# Patient Record
Sex: FEMALE | Race: WHITE | NOT HISPANIC OR LATINO | Employment: OTHER | URBAN - METROPOLITAN AREA
[De-identification: names, ages, dates, MRNs, and addresses within clinical notes are randomized per-mention and may not be internally consistent; named-entity substitution may affect disease eponyms.]

---

## 2019-02-11 ENCOUNTER — TELEPHONE (OUTPATIENT)
Dept: GASTROENTEROLOGY | Facility: CLINIC | Age: 39
End: 2019-02-11

## 2019-02-11 NOTE — TELEPHONE ENCOUNTER
Pt left [facility] stating she was in the ER on 1/27 and had an endoscopy and bx; asks for CB w/ results to [phone]

## 2021-08-03 ENCOUNTER — VBI (OUTPATIENT)
Dept: ADMINISTRATIVE | Facility: OTHER | Age: 41
End: 2021-08-03

## 2021-08-03 NOTE — TELEPHONE ENCOUNTER
Upon review of the In Basket request we were able to locate, review, and update the patient chart as requested for Immunization(s) Influenza  and Pap Smear (HPV) aka Cervical Cancer Screening  Any additional questions or concerns should be emailed to the Practice Liaisons via [email or web address] email, please do not reply via In Basket      Thank you  Shara Posada

## 2021-09-23 ENCOUNTER — ANNUAL EXAM (OUTPATIENT)
Dept: OBGYN CLINIC | Facility: CLINIC | Age: 41
End: 2021-09-23
Payer: COMMERCIAL

## 2021-09-23 VITALS
DIASTOLIC BLOOD PRESSURE: 70 MMHG | SYSTOLIC BLOOD PRESSURE: 118 MMHG | BODY MASS INDEX: 24.79 KG/M2 | HEIGHT: 65 IN | WEIGHT: 148.8 LBS

## 2021-09-23 DIAGNOSIS — Z01.419 ROUTINE GYNECOLOGICAL EXAMINATION: ICD-10-CM

## 2021-09-23 DIAGNOSIS — N90.7 INCLUSION CYST OF VULVA: ICD-10-CM

## 2021-09-23 DIAGNOSIS — Z80.3 FAMILY HISTORY OF BREAST CANCER: ICD-10-CM

## 2021-09-23 DIAGNOSIS — Z12.31 ENCOUNTER FOR SCREENING MAMMOGRAM FOR MALIGNANT NEOPLASM OF BREAST: Primary | ICD-10-CM

## 2021-09-23 PROCEDURE — 99396 PREV VISIT EST AGE 40-64: CPT | Performed by: OBSTETRICS & GYNECOLOGY

## 2021-09-23 NOTE — PROGRESS NOTES
[address] [facility]   [address], [place]    ASSESSMENT/PLAN: Jeri Cardenas is a 39 y o   who presents for annual gynecologic exam     Encounter for routine gynecologic examination  - Routine well woman exam completed today  - Cervical Cancer Screening: Current ASCCP Guidelines reviewed  Last Pap: 2020   Next Pap Due:   - COVID vaccine  JJ 3/2021  Flu shot  encourged  - STI screening offered including HIV testing: na  - Contraceptive counseling discussed  Current   Vasectomy  - Breast Cancer Screening: Last Mammogram  Summer  2021  At  [facility]   No fu needed  Hx of  Biopsy  x1  Benign  + family hx dw  Pt   Genetics      Additional problems addressed during this visit:  1  Encounter for screening mammogram for malignant neoplasm of breast  -     Mammo screening bilateral w 3d & cad; Future; Expected date: 2022    2  Family history of breast cancer  -     Mammo screening bilateral w 3d & cad; Future; Expected date: 2022        CC:  Annual Gynecologic Examination    HPI: Jeri Cardenas is a 39 y o   who presents for annual gynecologic examination  HPI    The following portions of the patient's history were reviewed and updated as appropriate: She  has a past medical history of Breast mass,  4 para 4, History of diagnostic mammography (2019), History of screening mammography (2021), Irregular menses, Left knee injury, Lump in female breast, Papanicolaou smear (09/15/2020), Postcoital bleeding, and Postpartum mastitis  She  has a past surgical history that includes Mammo (historical) (Bilateral); Breast excisional biopsy (Left, 2019); and Cyst Removal (Left, 2021)  Her family history includes Breast cancer in her maternal aunt and paternal grandmother  She  reports that she has quit smoking  Her smoking use included cigarettes  She has never used smokeless tobacco  She reports current alcohol use   She reports that she does not use drugs  No current outpatient medications on file  No current facility-administered medications for this visit  She has No Known Allergies       Review of Systems   Constitutional: Negative for chills and fever  HENT: Negative for ear pain and sore throat  Eyes: Negative for pain and visual disturbance  Respiratory: Negative for cough and shortness of breath  Cardiovascular: Negative for chest pain and palpitations  Gastrointestinal: Negative for abdominal pain and vomiting  Genitourinary: Negative for dysuria and hematuria  Musculoskeletal: Negative for arthralgias and back pain  Skin: Negative for color change and rash  Neurological: Negative for seizures and syncope  All other systems reviewed and are negative  Objective:  /70   Ht 5' 5 25" (1 657 m)   Wt 67 5 kg (148 lb 12 8 oz)   LMP 09/06/2021 (Exact Date)   Breastfeeding No   BMI 24 57 kg/m²    Physical Exam  Vitals and nursing note reviewed  Constitutional:       Appearance: Normal appearance  HENT:      Head: Normocephalic  Cardiovascular:      Rate and Rhythm: Normal rate and regular rhythm  Pulmonary:      Effort: Pulmonary effort is normal       Breath sounds: Normal breath sounds  Chest:      Chest wall: No mass, lacerations, swelling, tenderness or edema  Breasts: Bradley Score is 4  Breasts are symmetrical          Right: Normal  No swelling, bleeding, inverted nipple, mass, nipple discharge, skin change or tenderness  Left: No swelling, bleeding, inverted nipple, mass, nipple discharge, skin change or tenderness  Abdominal:      General: Abdomen is flat  Bowel sounds are normal       Palpations: Abdomen is soft  Genitourinary:     General: Normal vulva  Exam position: Lithotomy position  Pubic Area: No rash  Bradley stage (genital): 4       Labia:         Right: No rash, tenderness or lesion  Left: No rash, tenderness or lesion         Urethra: No urethral pain, urethral swelling or urethral lesion  Vagina: Normal       Cervix: No cervical motion tenderness or discharge  Uterus: Normal        Adnexa: Right adnexa normal and left adnexa normal       Rectum: Normal        Musculoskeletal:         General: Normal range of motion  Cervical back: Neck supple  Lymphadenopathy:      Upper Body:      Right upper body: No supraclavicular, axillary or pectoral adenopathy  Left upper body: No supraclavicular, axillary or pectoral adenopathy  Lower Body: No right inguinal adenopathy  No left inguinal adenopathy  Skin:     General: Skin is warm and dry  Neurological:      Mental Status: She is alert     Psychiatric:         Mood and Affect: Mood normal

## 2022-10-13 ENCOUNTER — ANNUAL EXAM (OUTPATIENT)
Dept: OBGYN CLINIC | Facility: CLINIC | Age: 42
End: 2022-10-13

## 2022-10-13 VITALS
WEIGHT: 152.6 LBS | DIASTOLIC BLOOD PRESSURE: 60 MMHG | SYSTOLIC BLOOD PRESSURE: 122 MMHG | HEIGHT: 66 IN | BODY MASS INDEX: 24.53 KG/M2

## 2022-10-13 DIAGNOSIS — I86.3 VULVAR VARICOSE VEINS: ICD-10-CM

## 2022-10-13 DIAGNOSIS — Z78.9 RELIES ON VASECTOMY AS PRIMARY BIRTH CONTROL METHOD: ICD-10-CM

## 2022-10-13 DIAGNOSIS — Z01.419 ROUTINE GYNECOLOGICAL EXAMINATION: Primary | ICD-10-CM

## 2022-10-13 DIAGNOSIS — Z12.31 ENCOUNTER FOR SCREENING MAMMOGRAM FOR MALIGNANT NEOPLASM OF BREAST: ICD-10-CM

## 2022-10-13 DIAGNOSIS — Z80.3 FAMILY HISTORY OF BREAST CANCER: ICD-10-CM

## 2022-10-13 NOTE — PROGRESS NOTES
[address] [facility]   [address], [place], [place]    ASSESSMENT/PLAN: Willow Ott is a 43 y o   who presents for annual gynecologic exam     Encounter for routine gynecologic examination  - Routine well woman exam completed today  - Cervical Cancer Screening: Current ASCCP Guidelines reviewed  Last Pap: 2020   Next Pap Due:   - COVID vaccine  JJ 3/2021  Flu shot  encourged  - STI screening offered including HIV testing: na  - Contraceptive counseling discussed  Current   Vasectomy  - Breast Cancer Screening: Last Mammogram  Summer  2021  At  [facility]   No fu needed  Hx of  Biopsy  x1  Benign  + family hx dw  Pt   Genetics      Additional problems addressed during this visit:  1  Routine gynecological examination    2  Encounter for screening mammogram for malignant neoplasm of breast  -     Mammo screening bilateral w 3d & cad; Future; Expected date: 10/11/2023    3  Family history of breast cancer  -     Mammo screening bilateral w 3d & cad; Future; Expected date: 10/11/2023    4  Vulvar varicose veins        CC:  Annual Gynecologic Examination    HPI: Willow Ott is a 43 y o   who presents for annual gynecologic examination  43-year-old  4 para 4 here for wellness exam   Last Pap smear [id number] negative HPV screening  History of breast biopsy fibroadenoma  The following portions of the patient's history were reviewed and updated as appropriate: She  has a past medical history of Breast mass,  4 para 4, History of diagnostic mammography (2019), History of screening mammography (2021), History of screening mammography (2022), Irregular menses, Left knee injury, Lump in female breast, Papanicolaou smear (09/15/2020), Postcoital bleeding, and Postpartum mastitis  She  has a past surgical history that includes Mammo (historical) (Bilateral); Breast excisional biopsy (Left, 2019); and Cyst Removal (Left, 2021)    Her family history includes Breast cancer in her maternal aunt and paternal grandmother  She  reports that she has quit smoking  Her smoking use included cigarettes  She has never used smokeless tobacco  She reports current alcohol use  She reports that she does not use drugs  No current outpatient medications on file  No current facility-administered medications for this visit  She has No Known Allergies       Review of Systems   Constitutional: Negative for chills and fever  HENT: Negative for ear pain and sore throat  Eyes: Negative for pain and visual disturbance  Respiratory: Negative for cough and shortness of breath  Cardiovascular: Negative for chest pain and palpitations  Gastrointestinal: Negative for abdominal pain and vomiting  Genitourinary: Negative for difficulty urinating, dysuria, hematuria, menstrual problem, pelvic pain, vaginal bleeding, vaginal discharge and vaginal pain  Musculoskeletal: Negative for arthralgias and back pain  Skin: Negative for color change and rash  Neurological: Negative for seizures and syncope  Hematological: Negative  Psychiatric/Behavioral: Negative  All other systems reviewed and are negative  Objective:  /60   Ht 5' 5 5" (1 664 m)   Wt 69 2 kg (152 lb 9 6 oz)   LMP 10/06/2022 (Exact Date)   Breastfeeding No   BMI 25 01 kg/m²    Physical Exam  Vitals and nursing note reviewed  Constitutional:       Appearance: Normal appearance  HENT:      Head: Normocephalic  Cardiovascular:      Rate and Rhythm: Normal rate and regular rhythm  Pulses: Normal pulses  Heart sounds: Normal heart sounds  Pulmonary:      Effort: Pulmonary effort is normal       Breath sounds: Normal breath sounds  Chest:      Chest wall: No mass, lacerations, swelling, tenderness or edema  Breasts: Bradley Score is 4   Breasts are symmetrical       Right: Normal  No swelling, bleeding, inverted nipple, mass, nipple discharge, skin change, tenderness, axillary adenopathy or supraclavicular adenopathy  Left: No swelling, bleeding, inverted nipple, mass, nipple discharge, skin change, tenderness, axillary adenopathy or supraclavicular adenopathy  Abdominal:      General: Abdomen is flat  Bowel sounds are normal       Palpations: Abdomen is soft  Genitourinary:     General: Normal vulva  Exam position: Lithotomy position  Pubic Area: No rash  Bradley stage (genital): 4       Labia:         Right: No rash, tenderness or lesion  Left: No rash, tenderness or lesion  Urethra: No urethral pain, urethral swelling or urethral lesion  Vagina: Normal       Cervix: No cervical motion tenderness or discharge  Uterus: Normal        Adnexa: Right adnexa normal and left adnexa normal       Rectum: Normal        Musculoskeletal:         General: Normal range of motion  Cervical back: Neck supple  Lymphadenopathy:      Upper Body:      Right upper body: No supraclavicular, axillary or pectoral adenopathy  Left upper body: No supraclavicular, axillary or pectoral adenopathy  Lower Body: No right inguinal adenopathy  No left inguinal adenopathy  Skin:     General: Skin is warm and dry  Neurological:      General: No focal deficit present  Mental Status: She is alert and oriented to person, place, and time  Mental status is at baseline  Psychiatric:         Mood and Affect: Mood normal          Behavior: Behavior normal          Thought Content:  Thought content normal          Judgment: Judgment normal

## 2022-12-12 PROBLEM — Z01.419 ROUTINE GYNECOLOGICAL EXAMINATION: Status: RESOLVED | Noted: 2022-10-13 | Resolved: 2022-12-12

## 2023-07-19 DIAGNOSIS — Z80.3 FAMILY HISTORY OF BREAST CANCER: ICD-10-CM

## 2023-07-19 DIAGNOSIS — Z12.31 ENCOUNTER FOR SCREENING MAMMOGRAM FOR MALIGNANT NEOPLASM OF BREAST: ICD-10-CM

## 2024-03-05 NOTE — PROGRESS NOTES
Assessment/Plan:  Calcium 1000 mg + 600-1000 IU Vit D daily. Pap with high risk HPV Q 3-5 years,  Annual mammogram, monthly breast self exam.  Exercise 150 minutes per week minimum. Kegels 20 times twice daily  Irreg intermenstrual spotting will check US Day 7-10 of cycle  Monitor periods Monitor cycles. Call periods coming closer than 21 days start to start. Lasting longer than 10 days. Soaking a super plus pad tampon in an hour for several hours.        Diagnoses and all orders for this visit:    Encounter for gynecological examination without abnormal finding    Encounter for screening mammogram for malignant neoplasm of breast  -     Mammo screening bilateral w 3d & cad; Future    Family history of breast cancer    Encounter for Papanicolaou smear for cervical cancer screening    Irregular intermenstrual bleeding  -     US pelvis complete w transvaginal; Future          Subjective:      Patient ID: Kathryn Schaefer is a 44 y.o. female.     4 para 4 here for wellness exam. Last Pap smear [id number] negative. FH breast cancer PGM MA History of breast biopsy fibroadenoma. Mammo 7/3/2023 neg Periods monthly she has had 2 cycles occurring 20 days normally 26-27  She also has had occasional spotting at ovulation just with wiping lasts a day if that. Denies abd or pelvic pain  Bowel and bladder are normal Vasectomy for BC         The following portions of the patient's history were reviewed and updated as appropriate: allergies, current medications, past family history, past medical history, past social history, past surgical history, and problem list.    Review of Systems   Constitutional:  Negative for fatigue and unexpected weight change.   Gastrointestinal:  Negative for abdominal distention, abdominal pain, constipation and diarrhea.   Genitourinary:  Negative for difficulty urinating, dyspareunia, dysuria, frequency, genital sores, menstrual problem, pelvic pain, urgency, vaginal bleeding, vaginal  "discharge and vaginal pain.   Neurological:  Negative for headaches.   Psychiatric/Behavioral: Negative.  Negative for dysphoric mood. The patient is not nervous/anxious.          Objective:      /64 (BP Location: Left arm, Patient Position: Sitting, Cuff Size: Standard)   Ht 5' 5.5\" (1.664 m)   Wt 71.2 kg (157 lb)   LMP 03/04/2024   BMI 25.73 kg/m²          Physical Exam  Vitals and nursing note reviewed.   Constitutional:       General: She is not in acute distress.     Appearance: Normal appearance.   HENT:      Head: Normocephalic and atraumatic.   Pulmonary:      Effort: Pulmonary effort is normal.   Chest:   Breasts:     Breasts are symmetrical.      Right: Normal. No mass, nipple discharge, skin change or tenderness.      Left: Normal. No mass, nipple discharge, skin change or tenderness.   Abdominal:      General: There is no distension.      Palpations: Abdomen is soft.      Tenderness: There is no abdominal tenderness. There is no guarding or rebound.   Genitourinary:     General: Normal vulva.      Exam position: Lithotomy position.      Labia:         Right: No rash, tenderness, lesion or injury.         Left: No rash, tenderness, lesion or injury.       Urethra: No prolapse, urethral pain, urethral swelling or urethral lesion.      Vagina: Normal. No erythema or lesions.      Cervix: No cervical motion tenderness, discharge, lesion or cervical bleeding.      Uterus: Normal.       Adnexa: Right adnexa normal and left adnexa normal.        Right: No mass or tenderness.          Left: No mass or tenderness.        Rectum: No mass or external hemorrhoid.      Comments: PAP from cervix + menses  Musculoskeletal:         General: Normal range of motion.   Lymphadenopathy:      Upper Body:      Right upper body: No axillary adenopathy.      Left upper body: No axillary adenopathy.      Lower Body: No right inguinal adenopathy. No left inguinal adenopathy.   Skin:     General: Skin is warm and dry. "   Neurological:      Mental Status: She is alert and oriented to person, place, and time.   Psychiatric:         Mood and Affect: Mood normal.         Behavior: Behavior normal.         Thought Content: Thought content normal.         Judgment: Judgment normal.

## 2024-03-06 ENCOUNTER — ANNUAL EXAM (OUTPATIENT)
Dept: OBGYN CLINIC | Facility: CLINIC | Age: 44
End: 2024-03-06
Payer: COMMERCIAL

## 2024-03-06 VITALS
WEIGHT: 157 LBS | BODY MASS INDEX: 25.23 KG/M2 | SYSTOLIC BLOOD PRESSURE: 106 MMHG | DIASTOLIC BLOOD PRESSURE: 64 MMHG | HEIGHT: 66 IN

## 2024-03-06 DIAGNOSIS — Z80.3 FAMILY HISTORY OF BREAST CANCER: ICD-10-CM

## 2024-03-06 DIAGNOSIS — Z01.419 ENCOUNTER FOR GYNECOLOGICAL EXAMINATION WITHOUT ABNORMAL FINDING: Primary | ICD-10-CM

## 2024-03-06 DIAGNOSIS — Z12.31 ENCOUNTER FOR SCREENING MAMMOGRAM FOR MALIGNANT NEOPLASM OF BREAST: ICD-10-CM

## 2024-03-06 DIAGNOSIS — Z12.4 ENCOUNTER FOR PAPANICOLAOU SMEAR FOR CERVICAL CANCER SCREENING: ICD-10-CM

## 2024-03-06 DIAGNOSIS — N92.1 IRREGULAR INTERMENSTRUAL BLEEDING: ICD-10-CM

## 2024-03-06 PROCEDURE — 99396 PREV VISIT EST AGE 40-64: CPT | Performed by: NURSE PRACTITIONER

## 2024-03-06 NOTE — PATIENT INSTRUCTIONS
Calcium 1000 mg + 600-1000 IU Vit D daily. Pap with high risk HPV Q 3-5 years,  Annual mammogram, monthly breast self exam.  Exercise 150 minutes per week minimum. Kegels 20 times twice daily  Irreg intermenstrual spotting will check US Day 7-10 of cycle  Monitor periods Monitor cycles. Call periods coming closer than 21 days start to start. Lasting longer than 10 days. Soaking a super plus pad tampon in an hour for several hours.

## 2024-03-11 ENCOUNTER — HOSPITAL ENCOUNTER (OUTPATIENT)
Dept: RADIOLOGY | Facility: HOSPITAL | Age: 44
Discharge: HOME | End: 2024-03-11
Payer: COMMERCIAL

## 2024-03-11 DIAGNOSIS — N92.1 IRREGULAR INTERMENSTRUAL BLEEDING: ICD-10-CM

## 2024-03-11 PROCEDURE — 76830 TRANSVAGINAL US NON-OB: CPT

## 2024-03-11 PROCEDURE — 76856 US EXAM PELVIC COMPLETE: CPT

## 2024-03-12 LAB
CLINICAL INFO: ABNORMAL
DATE PREVIOUS BX: ABNORMAL
HPV E6+E7 MRNA CVX QL NAA+PROBE: NOT DETECTED
LMP START DATE: ABNORMAL
SL AMB PREV. PAP:: ABNORMAL
SPECIMEN SOURCE CVX/VAG CYTO: ABNORMAL

## 2024-03-13 ENCOUNTER — OFFICE VISIT (OUTPATIENT)
Dept: OBGYN CLINIC | Facility: CLINIC | Age: 44
End: 2024-03-13

## 2024-03-13 VITALS
SYSTOLIC BLOOD PRESSURE: 110 MMHG | DIASTOLIC BLOOD PRESSURE: 70 MMHG | BODY MASS INDEX: 25.23 KG/M2 | WEIGHT: 157 LBS | HEIGHT: 66 IN

## 2024-03-13 DIAGNOSIS — Z12.4 ENCOUNTER FOR PAPANICOLAOU SMEAR FOR CERVICAL CANCER SCREENING: ICD-10-CM

## 2024-03-13 DIAGNOSIS — R87.615 ENCOUNTER FOR REPEAT PAP SMEAR DUE TO PREVIOUS INSUFF CERVICAL CELLS: Primary | ICD-10-CM

## 2024-03-13 PROCEDURE — REPAP: Performed by: NURSE PRACTITIONER

## 2024-03-13 NOTE — PROGRESS NOTES
"Assessment/Plan:    No problem-specific Assessment & Plan notes found for this encounter.       Diagnoses and all orders for this visit:    Encounter for repeat Pap smear due to previous insuff cervical cells  -     Thinprep Tis Pap (Refl) HPV mRNA E6/E7    Encounter for Papanicolaou smear for cervical cancer screening  -     Thinprep Tis Pap (Refl) HPV mRNA E6/E7    Other orders  -     Liquid-based pap, screening          Subjective:      Patient ID: Kathryn Schaefer is a 44 y.o. female.    Here for repeat pap seen 3/6/2024 for WA on menses PAP insuff. Neg HPV LMP 3/4/2024 Had US done for frequent menses and midcycle spotting results still pending         The following portions of the patient's history were reviewed and updated as appropriate: allergies, current medications, past family history, past medical history, past social history, past surgical history, and problem list.    Review of Systems      Objective:      /70 (BP Location: Left arm, Patient Position: Sitting, Cuff Size: Standard)   Ht 5' 5.5\" (1.664 m)   Wt 71.2 kg (157 lb)   LMP 03/04/2024   BMI 25.73 kg/m²          Physical Exam  Vitals and nursing note reviewed.   Constitutional:       General: She is not in acute distress.     Appearance: Normal appearance.   HENT:      Head: Normocephalic and atraumatic.   Genitourinary:     General: Normal vulva.      Exam position: Lithotomy position.      Vagina: Normal. No vaginal discharge or erythema.      Cervix: No cervical motion tenderness, discharge, lesion or cervical bleeding.      Comments: PAP from cervix   Skin:     General: Skin is warm and dry.   Neurological:      General: No focal deficit present.      Mental Status: She is alert and oriented to person, place, and time.   Psychiatric:         Mood and Affect: Mood normal.         Behavior: Behavior normal.         Thought Content: Thought content normal.         "

## 2024-03-20 ENCOUNTER — TELEPHONE (OUTPATIENT)
Dept: OBGYN CLINIC | Facility: CLINIC | Age: 44
End: 2024-03-20

## 2024-03-20 LAB
CLINICAL INFO: NORMAL
CYTO CVX: NORMAL
CYTOLOGY CMNT CVX/VAG CYTO-IMP: NORMAL
DATE PREVIOUS BX: NORMAL
LMP START DATE: NORMAL
SL AMB PREV. PAP:: NORMAL
SPECIMEN SOURCE CVX/VAG CYTO: NORMAL

## 2024-03-20 NOTE — TELEPHONE ENCOUNTER
Discussed results with pt US normal Lining is not thickened No polyps or fibroids if irreg frequent periods continue call and would do final step of endometrial biopsy if periods normal everything looks good.

## 2024-07-15 ENCOUNTER — NURSE TRIAGE (OUTPATIENT)
Age: 44
End: 2024-07-15

## 2024-07-15 NOTE — TELEPHONE ENCOUNTER
"Spoke with patient who reports periods less than 21 days since May.  5/23, 6/4, 6/22 and 7/12.  She reports normal bleeding, not heavy, lasting about 4 days. She reports back in March it seemed to be happening and was mentioned at previous appointment.  Appointment made for tomorrow.  No further questions or concerns at this time.    Reason for Disposition   Menstrual cycle < 21 days OR > 35 days, and occurs more than two cycles (2 months) this past year    Answer Assessment - Initial Assessment Questions  1. AMOUNT: \"Describe the bleeding that you are having.\"     - SPOTTING: spotting, or pinkish / brownish mucous discharge; does not fill panti-liner or pad     - MILD:  less than 1 pad / hour; less than patient's usual menstrual bleeding    - MODERATE: 1-2 pads / hour; 1 menstrual cup every 6 hours; small-medium blood clots (e.g., pea, grape, small coin)    - SEVERE: soaking 2 or more pads/hour for 2 or more hours; 1 menstrual cup every 2 hours; bleeding not contained by pads or continuous red blood from vagina; large blood clots (e.g., golf ball, large coin)       mild  2. ONSET: \"When did the bleeding begin?\" \"Is it continuing now?\"      7/12/24  3. MENSTRUAL PERIOD: \"When was the last normal menstrual period?\" \"How is this different than your period?\"      6/22, 6/4, 5/23  4. REGULARITY: \"How regular are your periods?\"      irregular  5. ABDOMINAL PAIN: \"Do you have any pain?\" \"How bad is the pain?\"  (e.g., Scale 1-10; mild, moderate, or severe)    - MILD (1-3): doesn't interfere with normal activities, abdomen soft and not tender to touch     - MODERATE (4-7): interferes with normal activities or awakens from sleep, tender to touch     - SEVERE (8-10): excruciating pain, doubled over, unable to do any normal activities       0/10  6. PREGNANCY: \"Could you be pregnant?\" \"Are you sexually active?\" \"Did you recently give birth?\"      No   7. BREASTFEEDING: \"Are you breastfeeding?\"      No  8. HORMONES: \"Are you " "taking any hormone medications, prescription or OTC?\" (e.g., birth control pills, estrogen)      no  9. BLOOD THINNERS: \"Do you take any blood thinners?\" (e.g., Coumadin/warfarin, Pradaxa/dabigatran, aspirin)      no  10. CAUSE: \"What do you think is causing the bleeding?\" (e.g., recent gyn surgery, recent gyn procedure; known bleeding disorder, cervical cancer, polycystic ovarian disease, fibroids)          unsure  11. HEMODYNAMIC STATUS: \"Are you weak or feeling lightheaded?\" If Yes, ask: \"Can you stand and walk normally?\"         denies  12. OTHER SYMPTOMS: \"What other symptoms are you having with the bleeding?\" (e.g., passed tissue, vaginal discharge, fever, menstrual-type cramps)        denies    Protocols used: Vaginal Bleeding - Abnormal-ADULT-[place]    "

## 2024-07-16 ENCOUNTER — OFFICE VISIT (OUTPATIENT)
Dept: OBGYN CLINIC | Facility: CLINIC | Age: 44
End: 2024-07-16
Payer: COMMERCIAL

## 2024-07-16 VITALS
SYSTOLIC BLOOD PRESSURE: 104 MMHG | BODY MASS INDEX: 25.71 KG/M2 | DIASTOLIC BLOOD PRESSURE: 70 MMHG | WEIGHT: 160 LBS | HEIGHT: 66 IN

## 2024-07-16 DIAGNOSIS — N92.1 MENORRHAGIA WITH IRREGULAR CYCLE: Primary | ICD-10-CM

## 2024-07-16 PROCEDURE — 58100 BIOPSY OF UTERUS LINING: CPT | Performed by: OBSTETRICS & GYNECOLOGY

## 2024-07-16 PROCEDURE — 99213 OFFICE O/P EST LOW 20 MIN: CPT | Performed by: OBSTETRICS & GYNECOLOGY

## 2024-07-16 NOTE — PROGRESS NOTES
[facility] - [place] [address], [address], [place] [address]    Assessment/Plan:  1. Menorrhagia with irregular cycle  Assessment & Plan:  Discussed w/ patient possible causes of menorrhagia included menstrual changes, perimenopause, fibroids, endometrial polyps, precancer or cancer of cervix or uterus.  Recent pap screening normal.  Recom check TSH and CBC for anemia.  U/S in March normal w/o evidence of polyp.  Recommend endometrial biopsy today.  Pt agrees.  Reviewed if benign likely perimenopausal.  Discussed options of expectant management, cyclic progesterone, or progesterone IUD.  Pt declines IUD, would likely try cyclic Aygestin.  Will call patient with results and make plan accordingly.  Orders:  -     Tissue Pathology  -     TSH, 3rd generation with Free T4 reflex; Future  -     CBC; Future  -     TSH, 3rd generation with Free T4 reflex  -     CBC  -     Endometrial biopsy    I have spent a total time of 25 minutes in caring for this patient on the day of the visit/encounter including Instructions for management, Patient and family education, Impressions, Counseling / Coordination of care, Reviewing / ordering tests, medicine, procedures  , and Obtaining or reviewing history  .        Subjective:   Kathryn Schaefer is a 44 y.o.  female.    HPI:   Kathryn presents for periods more frequently than every 21 days.    She reports normally cycles have been 25-26 days in length.  In 2023 she had a cycle only 20 days, this happened in  and then in last couple months she has had cycles from 12-18 days (start of one to the start of next).  Bleeding always normal for her - 4-5 days bleeding, not excessively heavy    Sexually active, vasectomy.    She did have ultrasound 3/22/2024 - The uterus is anteverted in position, measuring 9.6 x 4.0 x 5.2 cm.  No fibroids.  EMS 9mm, normal left and right ovaries.        Gyn History  Patient's last menstrual period was 2024.    "    Last pap smear: 2024    She  reports being sexually active and has had partner(s) who are male. She reports using the following method of birth control/protection: Male Sterilization.       OB History      Past Medical History:  : Asthma      Comment:  Resolved  No date: Breast mass  : Gestational diabetes  No date: Irregular menses  No date: Left knee injury  No date: Lump in female breast  09/15/2020: Papanicolaou smear  No date: Postcoital bleeding  No date: Postpartum mastitis  1986: Varicella     Past Surgical History:  2019: BREAST EXCISIONAL BIOPSY; Left      Comment:  Left breast nodule, 2:00, ultrasound guided                biopsy:fibroadenoma. Left breast nodule,behind                nipple/retroareolar,biopsy:Stromal fibrosis.  2021: CYST REMOVAL; Left      Comment:  benign cyst removed from left side of neck.     Social History     Tobacco Use    Smoking status: Former     Current packs/day: 0.00     Types: Cigarettes     Quit date: 2006     Years since quittin.5     Passive exposure: Never    Smokeless tobacco: Never   Vaping Use    Vaping status: Never Used   Substance Use Topics    Alcohol use: Yes     Alcohol/week: 4.0 standard drinks of alcohol     Types: 4 Glasses of wine per week     Comment: socially    Drug use: Never     Comment: No use        No current outpatient medications on file.    She has No Known Allergies..    ROS: Review of Systems   Constitutional: Negative.    Gastrointestinal: Negative.    Genitourinary:  Positive for menstrual problem.   Psychiatric/Behavioral: Negative.         Objective:  /70 (BP Location: Left arm, Patient Position: Sitting, Cuff Size: Standard)   Ht 5' 5.5\" (1.664 m)   Wt 72.6 kg (160 lb)   LMP 2024   BMI 26.22 kg/m²      Physical Exam  Constitutional:       Appearance: Normal appearance.   Genitourinary:     General: Normal vulva.      Vagina: Normal.      Cervix: Normal.      Uterus: Normal. Not " enlarged and not tender.       Adnexa:         Right: No mass or tenderness.          Left: No mass or tenderness.        Rectum: No external hemorrhoid.   Neurological:      Mental Status: She is alert.   Psychiatric:         Mood and Affect: Mood normal.         Behavior: Behavior normal.           Endometrial biopsy    Date/Time: 7/16/2024 5:45 PM    Performed by: Nereida Pardo MD  Authorized by: Nereida Pardo MD  Universal Protocol:  Consent: Verbal consent obtained.  Risks and benefits: risks, benefits and alternatives were discussed  Consent given by: patient  Patient understanding: patient states understanding of the procedure being performed  Patient identity confirmed: verbally with patient    Procedure:     Procedure: endometrial biopsy with Pipelle      A bivalve speculum was placed in the vagina: yes      Cervix cleaned and prepped: yes      A paracervical block was performed: no      Uterus sounded: yes      Uterus sound depth (cm):  7    Specimen collected: specimen collected and sent to pathology      Patient tolerated procedure well with no complications: yes

## 2024-07-16 NOTE — ASSESSMENT & PLAN NOTE
Discussed w/ patient possible causes of menorrhagia included menstrual changes, perimenopause, fibroids, endometrial polyps, precancer or cancer of cervix or uterus.  Recent pap screening normal.  Recom check TSH and CBC for anemia.  U/S in March normal w/o evidence of polyp.  Recommend endometrial biopsy today.  Pt agrees.  Reviewed if benign likely perimenopausal.  Discussed options of expectant management, cyclic progesterone, or progesterone IUD.  Pt declines IUD, would likely try cyclic Aygestin.  Will call patient with results and make plan accordingly.

## 2024-07-17 ENCOUNTER — TELEPHONE (OUTPATIENT)
Age: 44
End: 2024-07-17

## 2024-07-17 NOTE — TELEPHONE ENCOUNTER
[facility] staff calling asking for information on source for biopsy tissue received yesterday. RN advised source is endometrium from an EMB procedure. No further questions.

## 2024-07-18 LAB
ERYTHROCYTE [DISTWIDTH] IN BLOOD BY AUTOMATED COUNT: 11.9 % (ref 11–15)
HCT VFR BLD AUTO: 39 % (ref 35–45)
HGB BLD-MCNC: 13 G/DL (ref 11.7–15.5)
MCH RBC QN AUTO: 33.2 PG (ref 27–33)
MCHC RBC AUTO-ENTMCNC: 33.3 G/DL (ref 32–36)
MCV RBC AUTO: 99.5 FL (ref 80–100)
PLATELET # BLD AUTO: 185 THOUSAND/UL (ref 140–400)
PMV BLD REES-ECKER: 12.1 FL (ref 7.5–12.5)
RBC # BLD AUTO: 3.92 MILLION/UL (ref 3.8–5.1)
TSH SERPL-ACNC: 1.82 MIU/L
WBC # BLD AUTO: 9 THOUSAND/UL (ref 3.8–10.8)

## 2024-07-19 LAB
CLINICAL INFO: NORMAL
PATH REPORT.FINAL DX SPEC: NORMAL
PATHOLOGIST NAME: NORMAL
QUESTION/PROBLEM: NORMAL
SL AMB CONTAINER TYPE: NORMAL
SL AMB FINAL RESOLUTION: NORMAL
SL AMB REPORT STATUS: NORMAL
SPECIMEN SOURCE: NORMAL

## 2024-07-23 ENCOUNTER — TELEPHONE (OUTPATIENT)
Dept: OBGYN CLINIC | Facility: CLINIC | Age: 44
End: 2024-07-23

## 2024-07-23 NOTE — TELEPHONE ENCOUNTER
I called Kathryn to review questions.    1) unlikely polyp is cancer or precancer - risk of this is about 5% in postmenopausal woman with abnormal bleeding, less in premenopausal woman and even lower given we have biopsy that is benign.  I would estimate <1% risk.  2) SHG could rule in or out if polyp remains  3) answered questions about hysteroscopy, same day, back to normal next day, IV sedation only.    Kathryn thanked me for call and answers.  She will continue to consider options.  I told her all are valid options, no emergency.

## 2024-08-07 ENCOUNTER — PATIENT MESSAGE (OUTPATIENT)
Dept: OBGYN CLINIC | Facility: CLINIC | Age: 44
End: 2024-08-07

## 2024-09-02 NOTE — PROGRESS NOTES
History and Physical  [facility] - [place]  [address], [address], [place] [address]    Assessment/Plan:  1. Menorrhagia with irregular cycle  Assessment & Plan:  Kathryn was evaluated for frequent periods often < 21 days apart for months.  3/2024 ultrasound was unremarkable, 7/2024 labs TSH normal and no anemia.  Endometrial biopsy was benign but had polyp tissue in it.    Bleeding has continued to be frequent and we discussed that although the biopsy was benign, there may be more polyp remaining and this could cause bleeding issues.  The risk of malignancy is approximately 5% in postmenopausal women with bleeding abnormalities and less in premenopausal women without bleeding issues.  We could do a Sonohysterogram which is an ultrasound to look specifically for polyps, or we could do a procedure to look inside the uterus and remove polyps or tissue.    The recommended procedure is exam under anesthesia, hysteroscopy, D&C, possible polypectomy, which is performed in the hospital under IV sedation and allows us to visualize the cavity and remove the polyp.  Discussed R/B/A to this and expected recovery.  She wishes to proceed with procedure and declined sonohysterogram.    Discussed expected procedure, risks (including risk of bleeding, infection, damage to surrounding structure including but not limited to bladder, bowel, blood vessels and ureters), benefits, recovery and limitations.  All questions answered and consent signed.  Discussed will need PATs - EKG, CBC, and BMP.  PCP clearance is not necessary, unless abnormal EKG or requested by anesthesia.  Surgical scheduler will call patient to arrange.  We discussed that after the procedure if bleeding does not improve, we'll have ruled out uterine pathology and will have to consider this is a hormonal issue and discuss options to manage bleeding then.  2. Endometrial polyp  Comments:  noted on EMB pathology.    I have spent a total time of 35  minutes in caring for this patient on the day of the visit/encounter including Diagnostic results, Prognosis, Risks and benefits of tx options, Instructions for management, Patient and family education, Risk factor reductions, Impressions, Counseling / Coordination of care, Documenting in the medical record, Reviewing / ordering tests, medicine, procedures  , and Obtaining or reviewing history  .      History and Physical    Subjective:   Kathryn Schaefer is a 44 y.o.  female.    HPI:   Debbie presents today to discuss options for her abnormal bleeding.    I saw her in 2024 - when cycles had started occurring every 12-18 days with 4-5 bleeding each time.  Prior to 2023 she reported normally cycles have been 25-26 days in length. Progressively they had been occurring  more frequently since November.     She did have ultrasound 3/22/2024 - The uterus is anteverted in position, measuring 9.6 x 4.0 x 5.2 cm.  No fibroids.  EMS 9mm, normal left and right ovaries.    2024 CBC and TSH normal    2024 EMB - endometrial poly in background of proliferative endometrium.  Benign.    Today she reports frequency of periods has remained the same.          Gyn History  Patient's last menstrual period was 2024 (exact date).       Last pap smear: 2024    She  reports being sexually active and has had partner(s) who are male. She reports using the following method of birth control/protection: Male Sterilization.       OB History      Past Medical History:  1985: Asthma      Comment:  Resolved  : History of gestational diabetes  No date: Irregular menses  No date: Left knee injury  2019: Lump in female breast      Comment:  benign, removed  No date: Postcoital bleeding  1986: Varicella     Past Surgical History:  2019: BREAST EXCISIONAL BIOPSY; Left      Comment:  Left breast nodule, 2:00, ultrasound guided                biopsy:fibroadenoma. Left breast nodule,behind                 "nipple/retroareolar,biopsy:Stromal fibrosis.  2021: CYST REMOVAL; Left      Comment:  benign cyst removed from left side of neck.     Social History     Tobacco Use    Smoking status: Former     Current packs/day: 0.00     Types: Cigarettes     Quit date: 2006     Years since quittin.6     Passive exposure: Never    Smokeless tobacco: Never   Vaping Use    Vaping status: Never Used   Substance Use Topics    Alcohol use: Yes     Alcohol/week: 4.0 standard drinks of alcohol     Types: 4 Glasses of wine per week     Comment: socially    Drug use: Never     Comment: No use        No current outpatient medications on file.    She has No Known Allergies..    ROS: Review of Systems   Constitutional: Negative.    Gastrointestinal: Negative.    Genitourinary:  Positive for menstrual problem.   Psychiatric/Behavioral: Negative.         Objective:  /68 (BP Location: Right arm, Patient Position: Sitting, Cuff Size: Standard)   Ht 5' 5.5\" (1.664 m)   Wt 73 kg (161 lb)   LMP 2024 (Exact Date)   BMI 26.38 kg/m²      Physical Exam  Constitutional:       Appearance: Normal appearance.   Cardiovascular:      Rate and Rhythm: Normal rate.   Pulmonary:      Effort: Pulmonary effort is normal.      Breath sounds: Normal breath sounds.   Abdominal:      General: Bowel sounds are normal.      Palpations: Abdomen is soft.   Musculoskeletal:      Right ankle: No swelling.      Left ankle: No swelling.   Neurological:      Mental Status: She is alert and oriented to person, place, and time.         "

## 2024-09-05 ENCOUNTER — OFFICE VISIT (OUTPATIENT)
Dept: OBGYN CLINIC | Facility: CLINIC | Age: 44
End: 2024-09-05
Payer: COMMERCIAL

## 2024-09-05 VITALS
SYSTOLIC BLOOD PRESSURE: 108 MMHG | DIASTOLIC BLOOD PRESSURE: 68 MMHG | WEIGHT: 161 LBS | BODY MASS INDEX: 25.88 KG/M2 | HEIGHT: 66 IN

## 2024-09-05 DIAGNOSIS — N84.0 ENDOMETRIAL POLYP: ICD-10-CM

## 2024-09-05 DIAGNOSIS — N92.1 MENORRHAGIA WITH IRREGULAR CYCLE: Primary | ICD-10-CM

## 2024-09-05 PROCEDURE — 99214 OFFICE O/P EST MOD 30 MIN: CPT | Performed by: OBSTETRICS & GYNECOLOGY

## 2024-09-05 NOTE — ASSESSMENT & PLAN NOTE
Kathryn was evaluated for frequent periods often < 21 days apart for months.  3/2024 ultrasound was unremarkable, 7/2024 labs TSH normal and no anemia.  Endometrial biopsy was benign but had polyp tissue in it.    Bleeding has continued to be frequent and we discussed that although the biopsy was benign, there may be more polyp remaining and this could cause bleeding issues.  The risk of malignancy is approximately 5% in postmenopausal women with bleeding abnormalities and less in premenopausal women without bleeding issues.  We could do a Sonohysterogram which is an ultrasound to look specifically for polyps, or we could do a procedure to look inside the uterus and remove polyps or tissue.    The recommended procedure is exam under anesthesia, hysteroscopy, D&C, possible polypectomy, which is performed in the hospital under IV sedation and allows us to visualize the cavity and remove the polyp.  Discussed R/B/A to this and expected recovery.  She wishes to proceed with procedure and declined sonohysterogram.    Discussed expected procedure, risks (including risk of bleeding, infection, damage to surrounding structure including but not limited to bladder, bowel, blood vessels and ureters), benefits, recovery and limitations.  All questions answered and consent signed.  Discussed will need PATs - EKG, CBC, and BMP.  PCP clearance is not necessary, unless abnormal EKG or requested by anesthesia.  Surgical scheduler will call patient to arrange.  We discussed that after the procedure if bleeding does not improve, we'll have ruled out uterine pathology and will have to consider this is a hormonal issue and discuss options to manage bleeding then.

## 2024-09-12 ENCOUNTER — TELEPHONE (OUTPATIENT)
Dept: OBGYN CLINIC | Facility: CLINIC | Age: 44
End: 2024-09-12

## 2024-09-12 NOTE — TELEPHONE ENCOUNTER
----- Message from Nereida Pardo MD sent at 2024  1:07 PM EDT -----  Regarding: hysteroscopy, D&C, polypectomy  [facility] GYN Department  Surgery Scheduling Sheet    Patient Name: Kathryn Schaefer  : 1980    Provider: Nereida Pardo MD     Needed: no; Language: N/A    Procedure: exam under anesthesia, dilation and curettage , hysteroscopy, and possible polypectomy    Diagnosis: abnormal uterine bleeding    Special Needs or Equipment: Symphion (could be on stand by, unsure if I'll need it)    Anesthesia: IV sedation with anesthesia    Length of stay: outpatient  Does patient have comorbid conditions that will require close perioperative monitoring prior to safe discharge: no    The patient has comorbid conditions that will require close perioperative monitoring prior to safe discharge, including N/A.   This may require acute care beyond the usual and routine recovery period. As such, inpatient admission post-operatively is expected and appropriate, and anticipated hospital length of stay will be >2 midnights.    Pre-Admission Testing Needed: no   Labs that should be ordered: cbc, BMP, and EKG    Order PAT that is recommended in prep for procedure?: No    Medical Clearance Needed: no; Provider: N/A    MA Form Signed (tubals/hysterectomy): Not Indicated    Surgical Drink Given: no     How many days out of work: 1 day(s)     How many days no drivin day(s)       Is pre op appt needed?  no  Interval for post op appt: N/A    For Surgical Scheduler:     Surgery Scheduled On:  [place]: [facility]    Pre-op Appt:   Post op Appt:  Consult/Medical clearance appt:

## 2024-11-20 ENCOUNTER — APPOINTMENT (OUTPATIENT)
Dept: LAB | Facility: HOSPITAL | Age: 44
End: 2024-11-20
Payer: COMMERCIAL

## 2024-11-20 ENCOUNTER — RESULTS FOLLOW-UP (OUTPATIENT)
Dept: OBGYN CLINIC | Facility: CLINIC | Age: 44
End: 2024-11-20

## 2024-11-20 DIAGNOSIS — N93.9 ABNORMAL UTERINE BLEEDING: ICD-10-CM

## 2024-11-20 LAB
ANION GAP SERPL CALCULATED.3IONS-SCNC: 5 MMOL/L (ref 4–13)
BASOPHILS # BLD AUTO: 0.06 THOUSANDS/ÂΜL (ref 0–0.1)
BASOPHILS NFR BLD AUTO: 1 % (ref 0–1)
BUN SERPL-MCNC: 19 MG/DL (ref 5–25)
CALCIUM SERPL-MCNC: 8.9 MG/DL (ref 8.4–10.2)
CHLORIDE SERPL-SCNC: 106 MMOL/L (ref 96–108)
CO2 SERPL-SCNC: 27 MMOL/L (ref 21–32)
CREAT SERPL-MCNC: 0.77 MG/DL (ref 0.6–1.3)
EOSINOPHIL # BLD AUTO: 0.11 THOUSAND/ÂΜL (ref 0–0.61)
EOSINOPHIL NFR BLD AUTO: 1 % (ref 0–6)
ERYTHROCYTE [DISTWIDTH] IN BLOOD BY AUTOMATED COUNT: 12.1 % (ref 11.6–15.1)
GFR SERPL CREATININE-BSD FRML MDRD: 94 ML/MIN/1.73SQ M
GLUCOSE P FAST SERPL-MCNC: 92 MG/DL (ref 65–99)
HCT VFR BLD AUTO: 40.6 % (ref 34.8–46.1)
HGB BLD-MCNC: 13.7 G/DL (ref 11.5–15.4)
IMM GRANULOCYTES # BLD AUTO: 0.03 THOUSAND/UL (ref 0–0.2)
IMM GRANULOCYTES NFR BLD AUTO: 0 % (ref 0–2)
LYMPHOCYTES # BLD AUTO: 2.14 THOUSANDS/ÂΜL (ref 0.6–4.47)
LYMPHOCYTES NFR BLD AUTO: 28 % (ref 14–44)
MCH RBC QN AUTO: 33 PG (ref 26.8–34.3)
MCHC RBC AUTO-ENTMCNC: 33.7 G/DL (ref 31.4–37.4)
MCV RBC AUTO: 98 FL (ref 82–98)
MONOCYTES # BLD AUTO: 0.39 THOUSAND/ÂΜL (ref 0.17–1.22)
MONOCYTES NFR BLD AUTO: 5 % (ref 4–12)
NEUTROPHILS # BLD AUTO: 5.04 THOUSANDS/ÂΜL (ref 1.85–7.62)
NEUTS SEG NFR BLD AUTO: 65 % (ref 43–75)
NRBC BLD AUTO-RTO: 0 /100 WBCS
PLATELET # BLD AUTO: 185 THOUSANDS/UL (ref 149–390)
PMV BLD AUTO: 11.5 FL (ref 8.9–12.7)
POTASSIUM SERPL-SCNC: 4.2 MMOL/L (ref 3.5–5.3)
RBC # BLD AUTO: 4.15 MILLION/UL (ref 3.81–5.12)
SODIUM SERPL-SCNC: 138 MMOL/L (ref 135–147)
WBC # BLD AUTO: 7.77 THOUSAND/UL (ref 4.31–10.16)

## 2024-11-20 PROCEDURE — 36415 COLL VENOUS BLD VENIPUNCTURE: CPT

## 2024-11-20 PROCEDURE — 80048 BASIC METABOLIC PNL TOTAL CA: CPT

## 2024-11-20 PROCEDURE — 85025 COMPLETE CBC W/AUTO DIFF WBC: CPT

## 2024-11-23 LAB
ATRIAL RATE: 58 BPM
P AXIS: -5 DEGREES
PR INTERVAL: 150 MS
QRS AXIS: -2 DEGREES
QRSD INTERVAL: 94 MS
QT INTERVAL: 410 MS
QTC INTERVAL: 403 MS
T WAVE AXIS: 7 DEGREES
VENTRICULAR RATE: 58 BPM

## 2024-11-23 PROCEDURE — 93010 ELECTROCARDIOGRAM REPORT: CPT | Performed by: INTERNAL MEDICINE

## 2024-11-25 ENCOUNTER — RESULTS FOLLOW-UP (OUTPATIENT)
Dept: OBGYN CLINIC | Facility: CLINIC | Age: 44
End: 2024-11-25

## 2024-12-09 PROCEDURE — NC001 PR NO CHARGE: Performed by: OBSTETRICS & GYNECOLOGY

## 2024-12-09 NOTE — H&P
History and Physical  [facility] - [place]  [address], [address], [place] [address]     Assessment/Plan:  1. Menorrhagia with irregular cycle  Assessment & Plan:  Kathryn was evaluated for frequent periods often < 21 days apart for months.  3/2024 ultrasound was unremarkable, 7/2024 labs TSH normal and no anemia.  Endometrial biopsy was benign but had polyp tissue in it.    Bleeding has continued to be frequent and we discussed that although the biopsy was benign, there may be more polyp remaining and this could cause bleeding issues.  The risk of malignancy is approximately 5% in postmenopausal women with bleeding abnormalities and less in premenopausal women without bleeding issues.  We could do a Sonohysterogram which is an ultrasound to look specifically for polyps, or we could do a procedure to look inside the uterus and remove polyps or tissue.    The recommended procedure is exam under anesthesia, hysteroscopy, D&C, possible polypectomy, which is performed in the hospital under IV sedation and allows us to visualize the cavity and remove the polyp.  Discussed R/B/A to this and expected recovery.  She wishes to proceed with procedure and declined sonohysterogram.    Discussed expected procedure, risks (including risk of bleeding, infection, damage to surrounding structure including but not limited to bladder, bowel, blood vessels and ureters), benefits, recovery and limitations.  All questions answered and consent signed.  Discussed will need PATs - EKG, CBC, and BMP.  PCP clearance is not necessary, unless abnormal EKG or requested by anesthesia.  Surgical scheduler will call patient to arrange.  We discussed that after the procedure if bleeding does not improve, we'll have ruled out uterine pathology and will have to consider this is a hormonal issue and discuss options to manage bleeding then.  2. Endometrial polyp  Comments:  noted on EMB pathology.     I have spent a total time of 35  minutes in caring for this patient on the day of the visit/encounter including Diagnostic results, Prognosis, Risks and benefits of tx options, Instructions for management, Patient and family education, Risk factor reductions, Impressions, Counseling / Coordination of care, Documenting in the medical record, Reviewing / ordering tests, medicine, procedures  , and Obtaining or reviewing history  .        History and Physical     Subjective:   Kathryn Schaefer is a 44 y.o.  female.     HPI:   Debbie presents today to discuss options for her abnormal bleeding.     I saw her in 2024 - when cycles had started occurring every 12-18 days with 4-5 bleeding each time.  Prior to 2023 she reported normally cycles have been 25-26 days in length. Progressively they had been occurring  more frequently since November.     She did have ultrasound 3/22/2024 - The uterus is anteverted in position, measuring 9.6 x 4.0 x 5.2 cm.  No fibroids.  EMS 9mm, normal left and right ovaries.     2024 CBC and TSH normal     2024 EMB - endometrial poly in background of proliferative endometrium.  Benign.     Today she reports frequency of periods has remained the same.             Gyn History  Patient's last menstrual period was 2024 (exact date).     Last pap smear: 2024     She  reports being sexually active and has had partner(s) who are male. She reports using the following method of birth control/protection: Male Sterilization.     OB History         Past Medical History   Past Medical History:  : Asthma      Comment:  Resolved  : History of gestational diabetes  No date: Irregular menses  No date: Left knee injury  2019: Lump in female breast      Comment:  benign, removed  No date: Postcoital bleeding  1986: Varicella           Past Surgical History   Past Surgical History:  2019: BREAST EXCISIONAL BIOPSY; Left      Comment:  Left breast nodule, 2:00, ultrasound guided                 "biopsy:fibroadenoma. Left breast nodule,behind                nipple/retroareolar,biopsy:Stromal fibrosis.  2021: CYST REMOVAL; Left      Comment:  benign cyst removed from left side of neck.         Social History   Social History            Tobacco Use    Smoking status: Former       Current packs/day: 0.00       Types: Cigarettes       Quit date: 2006       Years since quittin.6       Passive exposure: Never    Smokeless tobacco: Never   Vaping Use    Vaping status: Never Used   Substance Use Topics    Alcohol use: Yes       Alcohol/week: 4.0 standard drinks of alcohol       Types: 4 Glasses of wine per week       Comment: socially    Drug use: Never       Comment: No use            Current Medications   No current outpatient medications on file.        She has No Known Allergies..     ROS: Review of Systems   Constitutional: Negative.    Gastrointestinal: Negative.    Genitourinary:  Positive for menstrual problem.   Psychiatric/Behavioral: Negative.           Objective: (from 2024)  /68 (BP Location: Right arm, Patient Position: Sitting, Cuff Size: Standard)   Ht 5' 5.5\" (1.664 m)   Wt 73 kg (161 lb)   LMP 2024 (Exact Date)   BMI 26.38 kg/m²      Objective  Physical Exam  Constitutional:       Appearance: Normal appearance.   Cardiovascular:      Rate and Rhythm: Normal rate.   Pulmonary:      Effort: Pulmonary effort is normal.      Breath sounds: Normal breath sounds.   Abdominal:      General: Bowel sounds are normal.      Palpations: Abdomen is soft.   Musculoskeletal:      Right ankle: No swelling.      Left ankle: No swelling.   Neurological:      Mental Status: She is alert and oriented to person, place, and time.         "

## 2024-12-11 NOTE — PRE-PROCEDURE INSTRUCTIONS
No outpatient medications have been marked as taking for the 12/12/24 encounter (Hospital Encounter).    Medication instructions for day surgery reviewed. Please use only a sip of water to take your instructed medications. Avoid all over the counter vitamins, supplements and NSAIDS for one week prior to surgery per anesthesia guidelines. Tylenol is ok to take as needed.     You will receive a call one business day prior to surgery with an arrival time and hospital directions. If your surgery is scheduled on a Monday, the hospital will be calling you on the Friday prior to your surgery. If you have not heard from anyone by 8pm, please call the hospital supervisor through the hospital  at [phone]. ([place] [phone] or [place] [phone]).    Do not eat or drink anything after midnight the night before your surgery, including candy, mints, lifesavers, or chewing gum. Do not drink alcohol 24hrs before your surgery. Try not to smoke at least 24hrs before your surgery.       Follow the pre surgery showering instructions as listed in the “My Surgical Experience Booklet” or otherwise provided by your surgeon's office. Do not use a blade to shave the surgical area 1 week before surgery. It is okay to use a clean electric clippers up to 24 hours before surgery. Do not apply any lotions, creams, including makeup, cologne, deodorant, or perfumes after showering on the day of your surgery. Do not use dry shampoo, hair spray, hair gel, or any type of hair products.     No contact lenses, eye make-up, or artificial eyelashes. Remove nail polish, including gel polish, and any artificial, gel, or acrylic nails if possible. Remove all jewelry including rings and body piercing jewelry.     Wear causal clothing that is easy to take on and off. Consider your type of surgery.    Keep any valuables, jewelry, piercings at home. Please bring any specially ordered equipment (sling, braces) if indicated.    Arrange for a  responsible person to drive you to and from the hospital on the day of your surgery. Please confirm the visitor policy for the day of your procedure when you receive your phone call with an arrival time.     Call the surgeon's office with any new illnesses, exposures, or additional questions prior to surgery.    Please reference your “My Surgical Experience Booklet” for additional information to prepare for your upcoming surgery.

## 2024-12-12 ENCOUNTER — HOSPITAL ENCOUNTER (OUTPATIENT)
Facility: HOSPITAL | Age: 44
Setting detail: OUTPATIENT SURGERY
Discharge: HOME | End: 2024-12-12
Attending: OBSTETRICS & GYNECOLOGY | Admitting: OBSTETRICS & GYNECOLOGY
Payer: COMMERCIAL

## 2024-12-12 ENCOUNTER — ANESTHESIA (OUTPATIENT)
Dept: PERIOP | Facility: HOSPITAL | Age: 44
End: 2024-12-12
Payer: COMMERCIAL

## 2024-12-12 ENCOUNTER — ANESTHESIA EVENT (OUTPATIENT)
Dept: PERIOP | Facility: HOSPITAL | Age: 44
End: 2024-12-12
Payer: COMMERCIAL

## 2024-12-12 VITALS
HEIGHT: 66 IN | RESPIRATION RATE: 19 BRPM | OXYGEN SATURATION: 100 % | BODY MASS INDEX: 25.55 KG/M2 | TEMPERATURE: 98 F | HEART RATE: 58 BPM | WEIGHT: 159 LBS | SYSTOLIC BLOOD PRESSURE: 108 MMHG | DIASTOLIC BLOOD PRESSURE: 71 MMHG

## 2024-12-12 DIAGNOSIS — N93.9 ABNORMAL UTERINE BLEEDING: ICD-10-CM

## 2024-12-12 LAB
EXT PREGNANCY TEST URINE: NEGATIVE
EXT. CONTROL: NORMAL

## 2024-12-12 PROCEDURE — 88305 TISSUE EXAM BY PATHOLOGIST: CPT | Performed by: STUDENT IN AN ORGANIZED HEALTH CARE EDUCATION/TRAINING PROGRAM

## 2024-12-12 PROCEDURE — 81025 URINE PREGNANCY TEST: CPT | Performed by: OBSTETRICS & GYNECOLOGY

## 2024-12-12 PROCEDURE — 58558 HYSTEROSCOPY BIOPSY: CPT | Performed by: OBSTETRICS & GYNECOLOGY

## 2024-12-12 RX ORDER — LIDOCAINE HYDROCHLORIDE 10 MG/ML
INJECTION, SOLUTION EPIDURAL; INFILTRATION; INTRACAUDAL; PERINEURAL AS NEEDED
Status: DISCONTINUED | OUTPATIENT
Start: 2024-12-12 | End: 2024-12-12

## 2024-12-12 RX ORDER — METOCLOPRAMIDE HYDROCHLORIDE 5 MG/ML
10 INJECTION INTRAMUSCULAR; INTRAVENOUS ONCE AS NEEDED
Status: DISCONTINUED | OUTPATIENT
Start: 2024-12-12 | End: 2024-12-12 | Stop reason: HOSPADM

## 2024-12-12 RX ORDER — FENTANYL CITRATE 50 UG/ML
INJECTION, SOLUTION INTRAMUSCULAR; INTRAVENOUS AS NEEDED
Status: DISCONTINUED | OUTPATIENT
Start: 2024-12-12 | End: 2024-12-12

## 2024-12-12 RX ORDER — ONDANSETRON 2 MG/ML
INJECTION INTRAMUSCULAR; INTRAVENOUS AS NEEDED
Status: DISCONTINUED | OUTPATIENT
Start: 2024-12-12 | End: 2024-12-12

## 2024-12-12 RX ORDER — ONDANSETRON 2 MG/ML
4 INJECTION INTRAMUSCULAR; INTRAVENOUS ONCE AS NEEDED
Status: DISCONTINUED | OUTPATIENT
Start: 2024-12-12 | End: 2024-12-12 | Stop reason: HOSPADM

## 2024-12-12 RX ORDER — DEXAMETHASONE SODIUM PHOSPHATE 10 MG/ML
INJECTION, SOLUTION INTRAMUSCULAR; INTRAVENOUS AS NEEDED
Status: DISCONTINUED | OUTPATIENT
Start: 2024-12-12 | End: 2024-12-12

## 2024-12-12 RX ORDER — PROPOFOL 10 MG/ML
INJECTION, EMULSION INTRAVENOUS CONTINUOUS PRN
Status: DISCONTINUED | OUTPATIENT
Start: 2024-12-12 | End: 2024-12-12

## 2024-12-12 RX ORDER — SODIUM CHLORIDE, SODIUM LACTATE, POTASSIUM CHLORIDE, CALCIUM CHLORIDE 600; 310; 30; 20 MG/100ML; MG/100ML; MG/100ML; MG/100ML
INJECTION, SOLUTION INTRAVENOUS CONTINUOUS PRN
Status: DISCONTINUED | OUTPATIENT
Start: 2024-12-12 | End: 2024-12-12

## 2024-12-12 RX ORDER — OXYCODONE HYDROCHLORIDE 5 MG/1
5 TABLET ORAL EVERY 4 HOURS PRN
Status: DISCONTINUED | OUTPATIENT
Start: 2024-12-12 | End: 2024-12-12 | Stop reason: HOSPADM

## 2024-12-12 RX ORDER — MIDAZOLAM HYDROCHLORIDE 2 MG/2ML
INJECTION, SOLUTION INTRAMUSCULAR; INTRAVENOUS AS NEEDED
Status: DISCONTINUED | OUTPATIENT
Start: 2024-12-12 | End: 2024-12-12

## 2024-12-12 RX ORDER — FENTANYL CITRATE/PF 50 MCG/ML
25 SYRINGE (ML) INJECTION
Status: DISCONTINUED | OUTPATIENT
Start: 2024-12-12 | End: 2024-12-12 | Stop reason: HOSPADM

## 2024-12-12 RX ORDER — ACETAMINOPHEN 325 MG/1
975 TABLET ORAL EVERY 6 HOURS PRN
Status: DISCONTINUED | OUTPATIENT
Start: 2024-12-12 | End: 2024-12-12 | Stop reason: HOSPADM

## 2024-12-12 RX ORDER — LIDOCAINE HYDROCHLORIDE 10 MG/ML
INJECTION, SOLUTION EPIDURAL; INFILTRATION; INTRACAUDAL; PERINEURAL AS NEEDED
Status: DISCONTINUED | OUTPATIENT
Start: 2024-12-12 | End: 2024-12-12 | Stop reason: HOSPADM

## 2024-12-12 RX ORDER — KETOROLAC TROMETHAMINE 30 MG/ML
INJECTION, SOLUTION INTRAMUSCULAR; INTRAVENOUS AS NEEDED
Status: DISCONTINUED | OUTPATIENT
Start: 2024-12-12 | End: 2024-12-12

## 2024-12-12 RX ADMIN — MIDAZOLAM 2 MG: 1 INJECTION INTRAMUSCULAR; INTRAVENOUS at 09:53

## 2024-12-12 RX ADMIN — PROPOFOL 20 MG: 10 INJECTION, EMULSION INTRAVENOUS at 10:03

## 2024-12-12 RX ADMIN — LIDOCAINE HYDROCHLORIDE 50 MG: 10 INJECTION, SOLUTION EPIDURAL; INFILTRATION; INTRACAUDAL; PERINEURAL at 10:00

## 2024-12-12 RX ADMIN — PROPOFOL 150 MCG/KG/MIN: 10 INJECTION, EMULSION INTRAVENOUS at 10:00

## 2024-12-12 RX ADMIN — FENTANYL CITRATE 50 MCG: 50 INJECTION, SOLUTION INTRAMUSCULAR; INTRAVENOUS at 10:00

## 2024-12-12 RX ADMIN — FENTANYL CITRATE 50 MCG: 50 INJECTION, SOLUTION INTRAMUSCULAR; INTRAVENOUS at 10:24

## 2024-12-12 RX ADMIN — ONDANSETRON 4 MG: 2 INJECTION INTRAMUSCULAR; INTRAVENOUS at 10:06

## 2024-12-12 RX ADMIN — KETOROLAC TROMETHAMINE 15 MG: 30 INJECTION, SOLUTION INTRAMUSCULAR; INTRAVENOUS at 10:17

## 2024-12-12 RX ADMIN — DEXAMETHASONE SODIUM PHOSPHATE 10 MG: 10 INJECTION, SOLUTION INTRAMUSCULAR; INTRAVENOUS at 10:06

## 2024-12-12 RX ADMIN — SODIUM CHLORIDE, SODIUM LACTATE, POTASSIUM CHLORIDE, AND CALCIUM CHLORIDE: .6; .31; .03; .02 INJECTION, SOLUTION INTRAVENOUS at 09:55

## 2024-12-12 NOTE — ANESTHESIA POSTPROCEDURE EVALUATION
Post-Op Assessment Note    CV Status:  Stable  Pain Score: 0    Pain management: adequate       Mental Status:  Alert and awake   Hydration Status:  Euvolemic   PONV Controlled:  Controlled   Airway Patency:  Patent     Post Op Vitals Reviewed: Yes    No anethesia notable event occurred.    Staff: CRNA           Last Filed PACU Vitals:  Vitals Value Taken Time   Temp 98 °F (36.7 °C) 12/12/24 1026   Pulse 59 12/12/24 1026   /75 12/12/24 1026   Resp 14 12/12/24 1026   SpO2 100 % 12/12/24 1026       Modified Robert:  No data recorded

## 2024-12-12 NOTE — OP NOTE
OPERATIVE REPORT  PATIENT NAME: Kathryn Schaefer    :  1980  MRN: [id number]  Pt Location: [facility] OR ROOM 04    SURGERY DATE: 2024    Surgeons and Role:     * Nereida Pardo MD - Primary    Preop Diagnosis:  Abnormal uterine bleeding [N93.9]    Post-Op Diagnosis Codes:     * Abnormal uterine bleeding [N93.9]      Endometrial polyp    Procedure(s):  HYSTEROSCOPY D+C. POSSIBLE POLYPECTOMY (LOOK IN UTERUS. SCRAPE UTERUS. REMOVE POLYP IF FOUND). EXAM UNDER ANESTHESIA    Specimen(s):  ID Type Source Tests Collected by Time Destination   1 : emc with polyp Tissue Endometrium TISSUE EXAM Nereida Pardo MD 2024 1013    2 : ecc Tissue Cervix, Endocervical TISSUE EXAM Nereida Pardo MD 2024 1015        Estimated Blood Loss:   Minimal    Drains:  * No LDAs found *    Anesthesia Type:   IV Sedation with Anesthesia    Operative Indications:  Abnormal uterine bleeding [N93.9]    Operative Findings:  Uterus sounded to 7cm, polyp tissue noted on left lateral uterine wall, both ostia seen; cavity cleared at end of procedure      Complications:   None    Procedure and Technique:  The patient was taken to the operating room where she was administered IV sedation by anesthesia. She was prepped and draped in the usual sterile fashion in the dorsal lithotomy position. A speculum was placed into the vagina to allow for good visualization of the cervix. A single-toothed tenaculum was then placed in the anterior lip of the cervix. 10cc of 1% lidocaine was used at 4:00 and 8:00 as a pericervical block.  The uterus was sounded to a depth of 7cm and then dilated to accommodate a #23 Slovenian brady dilator.    A 6.3mm diameter 30 degree angled Symphion hysteroscope was then inserted into the endocervical canal and crystalloid solution distending medium was turned on. The hysteroscope was advanced to the fundus and the diagnostic findings were as noted above. The resection device for the Symphion Tissues Removal System  was placed into the operating channel.  The device was activated to morcellate and remove the polyp tissue, with cautery applied as needed.  The hysteroscope was then removed and a thorough curettage of the endometrial canal was performed.  Upon completion, the single-toothed tenaculum was removed from the anterior lip of the cervix and good hemostasis was noted. All instruments were removed from the vagina.   Instrument, needle and sponge counts were correct x 2.  Fluid deficit at the end of the procedure was noted to be 50cc.    The patient tolerated the procedure well and was taken to the recovery room in stable condition.  Patient's  was called and findings reviewed.   I was present for the entire procedure.    Patient Disposition:  PACU              SIGNATURE: Nereida Pardo MD  DATE: December 12, 2024  TIME: 10:18 AM

## 2024-12-12 NOTE — DISCHARGE INSTR - AVS FIRST PAGE
Please relax for the next 12/24 hours.  It is okay to shower.  Take Motrin (ibuprofen) 600mg every 6 hours for the first 24 hours.  You may take Tylenol (acetaminophen) 650mg every 6 hours, in addition to the Motrin if needed.  After first 24 hours you can spread out the pain medication to as needed.  Light bleeding is to be expected, call office if bleeding heavier than a period.  Can last up to a week or maybe more.  No baths/swimming, tampons or intercourse until bleeding completely stops.  Please call office if you develop fever > 100.5, chills, severe abdominal pain not improved with medications above, vomiting, or heavy bleeding (heavier than a period).

## 2024-12-12 NOTE — ANESTHESIA PREPROCEDURE EVALUATION
Procedure:  HYSTEROSCOPY D+C, POSSIBLE POLYPECTOMY (LOOK IN UTERUS, SCRAPE UTERUS, REMOVE POLYP IF FOUND), EXAM UNDER ANESTHESIA (Uterus)    Relevant Problems   ANESTHESIA (within normal limits)        Physical Exam    Airway    Mallampati score: I  TM Distance: >3 FB  Neck ROM: full     Dental   No notable dental hx     Cardiovascular  Cardiovascular exam normal    Pulmonary  Pulmonary exam normal     Other Findings  post-pubertal.      Anesthesia Plan  ASA Score- 1     Anesthesia Type- IV sedation with anesthesia with ASA Monitors.         Additional Monitors:     Airway Plan:     Comment: I discussed risks (reviewed with patient on the anesthesia consent form), benefits and alternatives of monitored sedation including the possibility under sedation to have recall or mild discomfort.    .       Plan Factors-    Chart reviewed. EKG reviewed.  Existing labs reviewed. Patient summary reviewed.                  Induction- intravenous.    Postoperative Plan- Plan for postoperative opioid use.         Informed Consent- Anesthetic plan and risks discussed with patient.  I personally reviewed this patient with the CRNA. Discussed and agreed on the Anesthesia Plan with the CRNA..

## 2024-12-12 NOTE — INTERVAL H&P NOTE
H&P reviewed. After examining the patient I find no changes in the patients condition since the H&P had been written.  Periods have been more irregular lately.  LMP around Thanksgiving.    Vitals:    12/12/24 0844   BP: 116/75   Pulse: 63   Resp: 18   Temp: (!) 97.4 °F (36.3 °C)   SpO2: 99%

## 2024-12-13 NOTE — ANESTHESIA POSTPROCEDURE EVALUATION
Post-Op Assessment Note    CV Status:  Stable    Pain management: adequate       Mental Status:  Alert and awake   Hydration Status:  Euvolemic   PONV Controlled:  Controlled   Airway Patency:  Patent     Post Op Vitals Reviewed: Yes    No anethesia notable event occurred.    Staff: Anesthesiologist           Last Filed PACU Vitals:  Vitals Value Taken Time   Temp 98 °F (36.7 °C) 12/12/24 1026   Pulse 58 12/12/24 1100   /71 12/12/24 1100   Resp 19 12/12/24 1100   SpO2 100 % 12/12/24 1100       Modified Robert:  Activity: 2 (12/12/2024 11:00 AM)  Respiration: 2 (12/12/2024 11:00 AM)  Circulation: 2 (12/12/2024 11:00 AM)  Consciousness: 2 (12/12/2024 11:00 AM)  Oxygen Saturation: 2 (12/12/2024 11:00 AM)  Modified Robert Score: 10 (12/12/2024 11:00 AM)

## 2024-12-16 PROCEDURE — 88305 TISSUE EXAM BY PATHOLOGIST: CPT | Performed by: STUDENT IN AN ORGANIZED HEALTH CARE EDUCATION/TRAINING PROGRAM

## 2024-12-18 ENCOUNTER — RESULTS FOLLOW-UP (OUTPATIENT)
Dept: OBGYN CLINIC | Facility: CLINIC | Age: 44
End: 2024-12-18

## 2024-12-18 DIAGNOSIS — N92.1 MENORRHAGIA WITH IRREGULAR CYCLE: Primary | ICD-10-CM

## 2025-05-30 ENCOUNTER — ANNUAL EXAM (OUTPATIENT)
Dept: OBGYN CLINIC | Facility: CLINIC | Age: 45
End: 2025-05-30
Payer: COMMERCIAL

## 2025-05-30 VITALS
WEIGHT: 164.8 LBS | SYSTOLIC BLOOD PRESSURE: 110 MMHG | BODY MASS INDEX: 26.48 KG/M2 | DIASTOLIC BLOOD PRESSURE: 68 MMHG | HEIGHT: 66 IN

## 2025-05-30 DIAGNOSIS — Z12.31 ENCOUNTER FOR SCREENING MAMMOGRAM FOR MALIGNANT NEOPLASM OF BREAST: ICD-10-CM

## 2025-05-30 DIAGNOSIS — Z80.3 FAMILY HISTORY OF BREAST CANCER: ICD-10-CM

## 2025-05-30 DIAGNOSIS — Z01.419 ENCOUNTER FOR ANNUAL ROUTINE GYNECOLOGICAL EXAMINATION: Primary | ICD-10-CM

## 2025-05-30 PROCEDURE — 99396 PREV VISIT EST AGE 40-64: CPT | Performed by: OBSTETRICS & GYNECOLOGY

## 2025-05-30 NOTE — PATIENT INSTRUCTIONS
- Maintain healthy weight with BMI ideally between 18-25.    - Eat a healthy diet, including multiple servings of vegetables and fruits, as well as lean protein sources.  - Get at least 150 minutes of moderate aerobic activity or 75 minutes of vigorous aerobic activity a week, or a combination of moderate and vigorous activity.  Greater amounts of exercise will provide an even greater health benefit.   - Ensure diet provides 1200mg of Calcium daily (divided) and 800IU of vitamin D, or take supplements to meet this.  - Safe sex practices recommended.    - Resources - information on birth control and sexually transmitted infections - www.bedsider.org            - information on sexually transmitted infections - www.cdc.gov/std/    Orders for admission, patient is aware of plan and ready to go upstairs.  Any questions, please call ED YADIRA Aguilera at extension [phone]

## 2025-05-30 NOTE — PROGRESS NOTES
Annual Wellness Visit  [facility] - [address], [address], [place] [address]    ASSESSMENT/PLAN: Kathryn Schaefer is a 45 y.o.  who presents for annual gynecologic exam.    Assessment & Plan  Encounter for annual routine gynecological examination  - Routine well woman exam completed today.  - Cervical Cancer Screening: Current ASCCP Guidelines reviewed. Last Pap: 2024 . Past abnormal pap: none.  Next Pap Due: 2-3 years.  - Contraceptive counseling discussed.  Current contraception: vasectomy,   - Breast Cancer Screening: Last Mammogram 2024 per pt  - Colorectal cancer screening last Cologuard 2025, next due .  - The following were reviewed in today's visit: mammography screening ordered, menopause, exercise, and healthy diet       Family history of breast cancer    Orders:    Mammo screening bilateral w 3d and cad; Future    Encounter for screening mammogram for malignant neoplasm of breast    Orders:    Mammo screening bilateral w 3d and cad; Future      Next visit: 1 year Wellness      CC:  Annual Gynecologic Examination    HPI: Kathryn Schaefer is a 45 y.o.  who presents for annual gynecologic examination.  She denies any breast, urinary or pelvic issues at today's visit.    Last 3 periods regular - was a bit irregular 3 months after hysteroscopy.  No more normal.  Sexually active, no new partners.  Vasectomy.    2024 mammogram normal per pt.  2025 Cologuard negative per pt.      Gyn History  Patient's last menstrual period was 2025 (exact date).     Last Pap: 2024 was normal    She  reports being sexually active and has had partner(s) who are male. She reports using the following method of birth control/protection: Male Sterilization.       OB History      Past Medical History:  : Asthma      Comment:  Resolved  2010: Gestational diabetes  2010: History of gestational diabetes  No date: Irregular menses  No date: Left knee  "injury  2019: Lump in female breast      Comment:  benign, removed  No date: PONV (postoperative nausea and vomiting)  No date: Postcoital bleeding  1986: Varicella     Past Surgical History:  05/02/2019: BREAST EXCISIONAL BIOPSY; Left      Comment:  Left breast nodule, 2:00, ultrasound guided                biopsy:fibroadenoma. Left breast nodule,behind                nipple/retroareolar,biopsy:Stromal fibrosis.  09/13/2021: CYST REMOVAL; Left      Comment:  benign cyst removed from left side of neck.  No date: EGD  12/12/2024: [place] HYSTEROSCOPY BX ENDOMETRIUM&/POLYPC W/WO D&C; N/A      Comment:  Procedure: HYSTEROSCOPY D+C, POSSIBLE POLYPECTOMY (LOOK                IN UTERUS, SCRAPE UTERUS, REMOVE POLYP IF FOUND), EXAM                UNDER ANESTHESIA;  Surgeon: Nereida Pardo MD;                 Location:  MAIN OR;  Service: Gynecology  No date: WISDOM TOOTH EXTRACTION     Family History[1]     Social History[2]     Current Medications[3]    She has no known allergies..    ROS negative except as noted in HPI    Objective:  /68 (BP Location: Left arm, Patient Position: Sitting, Cuff Size: Large)   Ht 5' 5.5\" (1.664 m)   Wt 74.8 kg (164 lb 12.8 oz)   LMP 05/27/2025 (Exact Date)   BMI 27.01 kg/m²      Physical Exam  Constitutional:       Appearance: Normal appearance.   Chest:   Breasts:     Right: Normal. No mass or tenderness.      Left: Normal. No mass or tenderness.   Abdominal:      Palpations: Abdomen is soft.      Tenderness: There is no abdominal tenderness.   Genitourinary:     General: Normal vulva.      Vagina: Bleeding (scant dark end of menses noted) present. No lesions.      Cervix: Normal.      Uterus: Normal. Not tender.       Adnexa:         Right: No mass or tenderness.          Left: No mass or tenderness.        Rectum: No external hemorrhoid.     Musculoskeletal:         General: Normal range of motion.   Lymphadenopathy:      Upper Body:      Right upper body: No axillary adenopathy. "      Left upper body: No axillary adenopathy.     Neurological:      Mental Status: She is alert and oriented to person, place, and time.     Psychiatric:         Mood and Affect: Mood normal.         Behavior: Behavior normal.                [1]   Family History  Problem Relation Name Age of Onset    Hypertension Mother      COPD Mother      Hypertension Father      Completed Suicide  Brother      No Known Problems Brother      No Known Problems Daughter      No Known Problems Son      Anuerysm Maternal Grandmother      Heart attack Maternal Grandfather      Breast cancer Paternal Grandmother Gail Laws     Heart disease Paternal Grandfather      Alzheimer's disease Paternal Grandfather      Breast cancer Maternal Aunt      No Known Problems Child      No Known Problems Child      Uterine cancer Neg Hx      Ovarian cancer Neg Hx      Colon cancer Neg Hx     [2]   Social History  Tobacco Use    Smoking status: Former     Current packs/day: 0.00     Types: Cigarettes     Start date: 1998     Quit date: 2006     Years since quittin.4     Passive exposure: Never    Smokeless tobacco: Never   Vaping Use    Vaping status: Never Used   Substance Use Topics    Alcohol use: Yes     Alcohol/week: 4.0 standard drinks of alcohol     Types: 4 Glasses of wine per week     Comment: occasional    Drug use: Never     Comment: No use   [3] No current outpatient medications on file.

## 2025-05-30 NOTE — LETTER
May 30, 2025     CHA Camacho  [address]  [address]  [place] [address]    Patient: Kathryn Schaefer   YOB: 1980   Date of Visit: 2025       Dear CHA Coates:    Thank you for referring Kathryn Schaefer to me for evaluation. Below are my notes for this consultation.    If you have questions, please do not hesitate to call me. I look forward to following your patient along with you.         Sincerely,        Nereida Pardo MD        CC: No Recipients    Nereida Pardo MD  2025  2:54 PM  Sign when Signing Visit  Annual Wellness Visit  [facility] [address], [address], [place] [address]    ASSESSMENT/PLAN: Kathryn Schaefer is a 45 y.o.  who presents for annual gynecologic exam.    Assessment & Plan  Encounter for annual routine gynecological examination  - Routine well woman exam completed today.  - Cervical Cancer Screening: Current ASCCP Guidelines reviewed. Last Pap: 2024 . Past abnormal pap: none.  Next Pap Due: 2-3 years.  - Contraceptive counseling discussed.  Current contraception: vasectomy,   - Breast Cancer Screening: Last Mammogram 2024 per pt  - Colorectal cancer screening last Cologuard 2025, next due .  - The following were reviewed in today's visit: mammography screening ordered, menopause, exercise, and healthy diet       Family history of breast cancer    Orders:  •  Mammo screening bilateral w 3d and cad; Future    Encounter for screening mammogram for malignant neoplasm of breast    Orders:  •  Mammo screening bilateral w 3d and cad; Future      Next visit: 1 year Wellness      CC:  Annual Gynecologic Examination    HPI: Kathryn Schaefer is a 45 y.o.  who presents for annual gynecologic examination.  She denies any breast, urinary or pelvic issues at today's visit.    Last 3 periods regular - was a bit irregular 3 months after hysteroscopy.  No more normal.  Sexually active, no  "new partners.  Vasectomy.    2024 mammogram normal per pt.  2025 Cologuard negative per pt.      Gyn History  Patient's last menstrual period was 2025 (exact date).     Last Pap: 2024 was normal    She  reports being sexually active and has had partner(s) who are male. She reports using the following method of birth control/protection: Male Sterilization.       OB History      Past Medical History:  : Asthma      Comment:  Resolved  : Gestational diabetes  : History of gestational diabetes  No date: Irregular menses  No date: Left knee injury  2019: Lump in female breast      Comment:  benign, removed  No date: PONV (postoperative nausea and vomiting)  No date: Postcoital bleeding  1986: Varicella     Past Surgical History:  2019: BREAST EXCISIONAL BIOPSY; Left      Comment:  Left breast nodule, 2:00, ultrasound guided                biopsy:fibroadenoma. Left breast nodule,behind                nipple/retroareolar,biopsy:Stromal fibrosis.  2021: CYST REMOVAL; Left      Comment:  benign cyst removed from left side of neck.  No date: EGD  2024: [place] HYSTEROSCOPY BX ENDOMETRIUM&/POLYPC W/WO D&C; N/A      Comment:  Procedure: HYSTEROSCOPY D+C, POSSIBLE POLYPECTOMY (LOOK                IN UTERUS, SCRAPE UTERUS, REMOVE POLYP IF FOUND), EXAM                UNDER ANESTHESIA;  Surgeon: Nereida Pardo MD;                 Location:  MAIN OR;  Service: Gynecology  No date: WISDOM TOOTH EXTRACTION     Family History[1]     Social History[2]     Current Medications[3]    She has no known allergies..    ROS negative except as noted in HPI    Objective:  /68 (BP Location: Left arm, Patient Position: Sitting, Cuff Size: Large)   Ht 5' 5.5\" (1.664 m)   Wt 74.8 kg (164 lb 12.8 oz)   LMP 2025 (Exact Date)   BMI 27.01 kg/m²      Physical Exam  Constitutional:       Appearance: Normal appearance.   Chest:   Breasts:     Right: Normal. No mass or tenderness.      Left: " Normal. No mass or tenderness.   Abdominal:      Palpations: Abdomen is soft.      Tenderness: There is no abdominal tenderness.   Genitourinary:     General: Normal vulva.      Vagina: Bleeding (scant dark end of menses noted) present. No lesions.      Cervix: Normal.      Uterus: Normal. Not tender.       Adnexa:         Right: No mass or tenderness.          Left: No mass or tenderness.        Rectum: No external hemorrhoid.     Musculoskeletal:         General: Normal range of motion.   Lymphadenopathy:      Upper Body:      Right upper body: No axillary adenopathy.      Left upper body: No axillary adenopathy.     Neurological:      Mental Status: She is alert and oriented to person, place, and time.     Psychiatric:         Mood and Affect: Mood normal.         Behavior: Behavior normal.                  [1]  Family History  Problem Relation Name Age of Onset   • Hypertension Mother     • COPD Mother     • Hypertension Father     • Completed Suicide  Brother     • No Known Problems Brother     • No Known Problems Daughter     • No Known Problems Son     • Anuerysm Maternal Grandmother     • Heart attack Maternal Grandfather     • Breast cancer Paternal Grandmother Gail Laws    • Heart disease Paternal Grandfather     • Alzheimer's disease Paternal Grandfather     • Breast cancer Maternal Aunt     • No Known Problems Child     • No Known Problems Child     • Uterine cancer Neg Hx     • Ovarian cancer Neg Hx     • Colon cancer Neg Hx     [2]  Social History  Tobacco Use   • Smoking status: Former     Current packs/day: 0.00     Types: Cigarettes     Start date: 1998     Quit date: 2006     Years since quittin.4     Passive exposure: Never   • Smokeless tobacco: Never   Vaping Use   • Vaping status: Never Used   Substance Use Topics   • Alcohol use: Yes     Alcohol/week: 4.0 standard drinks of alcohol     Types: 4 Glasses of wine per week     Comment: occasional   • Drug use: Never     Comment: No  use   [3]  No current outpatient medications on file.

## (undated) DEVICE — TUBING SUCTION 5MM X 12 FT

## (undated) DEVICE — SYRINGE 10ML LL CONTROL TOP

## (undated) DEVICE — TISSUE REMOVAL SYSTEM RESECTING DEVICE: Brand: SYMPHION

## (undated) DEVICE — GLOVE INDICATOR PI UNDERGLOVE SZ 7.5 BLUE

## (undated) DEVICE — NEEDLE SPINAL 22G X 3.5IN  QUINCKE

## (undated) DEVICE — CHLORHEXIDINE 4PCT 4 OZ

## (undated) DEVICE — PAD SANITARY

## (undated) DEVICE — MAT ABSORBANT ARTHROSCOPY FLOOR 46 X 40 IN

## (undated) DEVICE — STRL ALLENTOWN HYSTEROSCOPY PK: Brand: CARDINAL HEALTH

## (undated) DEVICE — TISSUE REMOVAL SYSTEM FLUID MANAGEMENT ACCESSORIES: Brand: SYMPHION

## (undated) DEVICE — PREMIUM DRY TRAY LF: Brand: MEDLINE INDUSTRIES, INC.